# Patient Record
Sex: FEMALE | Race: BLACK OR AFRICAN AMERICAN | NOT HISPANIC OR LATINO
[De-identification: names, ages, dates, MRNs, and addresses within clinical notes are randomized per-mention and may not be internally consistent; named-entity substitution may affect disease eponyms.]

---
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
## 2025-03-10 NOTE — HISTORY OF PRESENT ILLNESS
Please review .protocol failed/ has no protocol    Last Office Visit: 07/09/2024  Please advise if refill is appropriate.  Requested Prescriptions     Pending Prescriptions Disp Refills    amLODIPine 5 MG Oral Tab 90 tablet 1     Sig: Take 1 tablet (5 mg total) by mouth daily.   Please see message below for upcoming appointment.    Future Appointments   Date Time Provider Department Center   6/5/2025  2:40 PM [facility]        [de-identified] : cc left knee.  17-year-old female presents left knee. She does play basketball and will be attending college in the fall.  on exam left knee has well-healed incision no effusion full range of motion excellent Endpoint but still some thigh atrophy with 2 cm difference compared left smaller than right 10 cm from the superior pole of the patella  recommending cleared to play basketball with functional derotational brace.  She understands the risk of retearing this, I also talked to her  about continuing to strengthen the left lower extremity with isolation